# Patient Record
(demographics unavailable — no encounter records)

---

## 2024-12-19 NOTE — PHYSICAL EXAM
[General Appearance - Well Developed] : well developed [Normal Appearance] : normal appearance [Well Groomed] : well groomed [] : no respiratory distress [Skin Color & Pigmentation] : normal skin color and pigmentation [Oriented To Time, Place, And Person] : oriented to person, place, and time [Affect] : the affect was normal [Mood] : the mood was normal

## 2024-12-27 NOTE — ADDENDUM
[FreeTextEntry1] : This note was partly authored by Misael Fraire working as a scribe for NIALL Montemayor. I, NIALL Montemayor, have reviewed the content of this note and confirm it is true and accurate. I personally performed the history and physical examination and made all the decisions. 12/19/2024.

## 2024-12-27 NOTE — ASSESSMENT
[FreeTextEntry1] : BUN, Cr, Repeat PSA. Renewed his year supply of Finasteride and Alfuzosin.  IF PSA stable, can follow up in 1 yr or if any worsened symptoms.

## 2024-12-27 NOTE — HISTORY OF PRESENT ILLNESS
[FreeTextEntry1] : 12/19/2024: VIRGINIE PIMENTEL is a 73 year-old Male h/o thyroid cancer, no longer needing chemo medication. I see him for BPH and LUTS and he is doing well on Alfuzosin and Finasteride.  Presents today with follow up No new urological complaints Voiding frequency every 3 hours daytime, and x3-4's times during nighttime.   Tolerating Alfuzosin and finasteride.   Labs on 12/14/2023 showed: --Norm PSA at 0.44.  --Normal urine. No inflammation, no blood, no cancer cells.   FMHx Mom breast ca dx'ed at 43 yo passed at 49 yo. Dad is alive at 98 yo, will be 100 yo next April  Denies FMHx prostate kidney bladder CA.    Pt is managing his thyroid cancer: Pt was originally scheduled for PET CT on 2/2024 but was unable to obtain it because his doctor retired. Pt has appointment with new doctor next month.

## 2024-12-27 NOTE — REVIEW OF SYSTEMS
[Pain during urination] : denies pain during urination [Pain at onset of urination] : denies pain during onset of urination [Blood in urine that you can see] : denies seeing blood in urine